# Patient Record
Sex: FEMALE | Race: WHITE | URBAN - METROPOLITAN AREA
[De-identification: names, ages, dates, MRNs, and addresses within clinical notes are randomized per-mention and may not be internally consistent; named-entity substitution may affect disease eponyms.]

---

## 2019-12-01 ENCOUNTER — HOSPITAL ENCOUNTER (OUTPATIENT)
Dept: MEDSURG UNIT | Facility: HOSPITAL | Age: 29
End: 2019-12-02
Attending: SURGERY | Admitting: SURGERY

## 2019-12-01 LAB
ABS NEUT (OLG): 11.14 X10(3)/MCL (ref 2.1–9.2)
ALBUMIN SERPL-MCNC: 4 GM/DL (ref 3.4–5)
ALBUMIN/GLOB SERPL: 1.2 RATIO (ref 1.1–2)
ALP SERPL-CCNC: 32 UNIT/L (ref 38–126)
ALT SERPL-CCNC: 21 UNIT/L (ref 12–78)
APPEARANCE, UA: CLEAR
AST SERPL-CCNC: 13 UNIT/L (ref 15–37)
BACTERIA SPEC CULT: ABNORMAL /HPF
BASOPHILS # BLD AUTO: 0 X10(3)/MCL (ref 0–0.2)
BASOPHILS NFR BLD AUTO: 0 %
BILIRUB SERPL-MCNC: 1.1 MG/DL (ref 0.2–1)
BILIRUB UR QL STRIP: NEGATIVE
BILIRUBIN DIRECT+TOT PNL SERPL-MCNC: 0.2 MG/DL (ref 0–0.5)
BILIRUBIN DIRECT+TOT PNL SERPL-MCNC: 0.9 MG/DL (ref 0–0.8)
BUN SERPL-MCNC: 14 MG/DL (ref 7–18)
CALCIUM SERPL-MCNC: 9 MG/DL (ref 8.5–10.1)
CHLORIDE SERPL-SCNC: 104 MMOL/L (ref 98–107)
CO2 SERPL-SCNC: 27 MMOL/L (ref 21–32)
COLOR UR: YELLOW
CREAT SERPL-MCNC: 0.76 MG/DL (ref 0.55–1.02)
EOSINOPHIL # BLD AUTO: 0 X10(3)/MCL (ref 0–0.9)
EOSINOPHIL NFR BLD AUTO: 0 %
ERYTHROCYTE [DISTWIDTH] IN BLOOD BY AUTOMATED COUNT: 11.6 % (ref 11.5–17)
GLOBULIN SER-MCNC: 3.4 GM/DL (ref 2.4–3.5)
GLUCOSE (UA): NEGATIVE
GLUCOSE SERPL-MCNC: 102 MG/DL (ref 74–106)
HCT VFR BLD AUTO: 37.5 % (ref 37–47)
HGB BLD-MCNC: 12 GM/DL (ref 12–16)
HGB UR QL STRIP: NEGATIVE
KETONES UR QL STRIP: NEGATIVE
LEUKOCYTE ESTERASE UR QL STRIP: ABNORMAL
LYMPHOCYTES # BLD AUTO: 1.4 X10(3)/MCL (ref 0.6–4.6)
LYMPHOCYTES NFR BLD AUTO: 10 %
MCH RBC QN AUTO: 29.7 PG (ref 27–31)
MCHC RBC AUTO-ENTMCNC: 32 GM/DL (ref 33–36)
MCV RBC AUTO: 92.8 FL (ref 80–94)
MONOCYTES # BLD AUTO: 0.7 X10(3)/MCL (ref 0.1–1.3)
MONOCYTES NFR BLD AUTO: 6 %
NEUTROPHILS # BLD AUTO: 11.14 X10(3)/MCL (ref 2.1–9.2)
NEUTROPHILS NFR BLD AUTO: 83 %
NITRITE UR QL STRIP: NEGATIVE
PH UR STRIP: 8.5 [PH] (ref 5–9)
PLATELET # BLD AUTO: 163 X10(3)/MCL (ref 130–400)
PMV BLD AUTO: 10.3 FL (ref 9.4–12.4)
POTASSIUM SERPL-SCNC: 3.8 MMOL/L (ref 3.5–5.1)
PROT SERPL-MCNC: 7.4 GM/DL (ref 6.4–8.2)
PROT UR QL STRIP: NEGATIVE
RBC # BLD AUTO: 4.04 X10(6)/MCL (ref 4.2–5.4)
RBC #/AREA URNS HPF: ABNORMAL /[HPF]
SODIUM SERPL-SCNC: 139 MMOL/L (ref 136–145)
SP GR UR STRIP: 1.01 (ref 1–1.03)
SQUAMOUS EPITHELIAL, UA: ABNORMAL
UROBILINOGEN UR STRIP-ACNC: 0.2
WBC # SPEC AUTO: 13.4 X10(3)/MCL (ref 4.5–11.5)
WBC #/AREA URNS HPF: ABNORMAL /[HPF]

## 2019-12-18 ENCOUNTER — HISTORICAL (OUTPATIENT)
Dept: ADMINISTRATIVE | Facility: HOSPITAL | Age: 29
End: 2019-12-18

## 2022-04-30 NOTE — ED PROVIDER NOTES
Patient:   Katrin Frye             MRN: [id number]            FIN: [id number]               Age:   29 years     Sex:  Female     :  1990   Associated Diagnoses:   Acute appendicitis   Author:   Usman LEWIS MD, Myron MORA      Basic Information   Time seen: Date & time 2019 13:12:00.   History source: Patient.   Arrival mode: Private vehicle, walking.   History limitation: None.      History of Present Illness   The patient presents with abdominal pain, 29-year-old white female complains right lower quadrant abdominal pain for 3 days intermittently worsening today since 3 AM with nausea vomiting.  Seen in urgent care referred here.  Denies fever.  Last cycle .  Sarah Hernandez nurse practitioner, SORT NOTE and   I, Dr. Mary, assumed care of this patient at 1417.     28 y/o CF presents to the ED for RLQ pain that started three days ago. Pt visited an urgent care earlier today and was told to come to the ED for possible appendicitis. Pt states that when the abdominal pain first started it was central and moved to her right side. Pt states that around 0300 she started becoming nauseous and vomiting. She also complains of constipation that resolved at 1200, chills, sweats, rhinorrhea, and bilateral low back pain however she denies any fever. Pt's LMP started on 10/31/19. .  The onset was 3  days ago.  The course/duration of symptoms is constant and worsening.  The character of symptoms is pressure.  The degree at onset was moderate.  The Location of pain at onset was right, lower, abdominal and mid epigastric.  The degree at present is minimal.  The Location of pain at present is right, lower and abdominal.  Radiating pain: both sides of the back.  lower back. The exacerbating factor is none.  The relieving factor is none.  Therapy today: Urgent care visit, Zofran.  Risk factors consist of none.  Associated symptoms: nausea, vomiting, back pain and chills.  Additional history: none.         Review of Systems   Constitutional symptoms:  Chills, sweats, no fever, no weakness.    Skin symptoms:  No rash   Eye symptoms:  No recent vision problems,    ENMT symptoms:  rhinorrhea, No ear pain,    Respiratory symptoms:  No shortness of breath, no orthopnea, no cough   Cardiovascular symptoms:  No chest pain, no palpitations   Gastrointestinal symptoms:  Abdominal pain, moderate, right lower quadrant, nausea, vomiting, constipation (Finally had BM at 1200), No diarrhea,    Genitourinary symptoms:  No dysuria, no hematuria.    Musculoskeletal symptoms:  Back pain, No Muscle pain,    Psychiatric symptoms:  No anxiety, no depression.    Allergy/immunologic symptoms:  No seasonal allergies, no food allergies.              Additional review of systems information: All other systems reviewed and otherwise negative.      Health Status   Allergies: No known allergies.   Medications:  (Selected)   Inpatient Medications  Ordered  Normal Saline (0.9% NS) IV 1,000 mL: 1,000 mL, 1,000 mL, IV, 999, start date 12/01/19 13:14:00 CST  Toradol: 30 mg, form: Injection, IV Push, Once, first dose 12/01/19 14:23:00 CST, stop date 12/01/19 14:23:00 CST, STAT.   Immunizations: Up to date.   Menstrual history: Last menstrual period: Date 10/31/2019.      Past Medical/ Family/ Social History   Medical history: Negative.   Surgical history: Negative.   Family history: Not significant.   Social history: Alcohol use: Regularly, Tobacco use: Denies.      Physical Examination               Vital Signs   Vital Signs   12/1/2019 13:10 CST      Temperature Oral          36.8 DegC                             Temperature Oral (calculated)             98.24 DegF                             Peripheral Pulse Rate     64 bpm                             Respiratory Rate          16 br/min                             SpO2                      98 %                             Oxygen Therapy            Room air                             Systolic  Blood Pressure   106 mmHg                             Diastolic Blood Pressure  62 mmHg  .   Basic Oxygen Information   12/1/2019 13:10 CST      SpO2                      98 %                             Oxygen Therapy            Room air  .   General:  Alert, no acute distress   Neurological:  Alert and oriented to person, place, time, and situation, No focal neurological deficit observed, CN II-XII intact.    Skin:  Warm, dry   Head:  Normocephalic, atraumatic   Neck:  Supple, trachea midline, no tenderness   Eye:  Pupils are equal, round and reactive to light, extraocular movements are intact   Ears, nose, mouth and throat:  Oral mucosa moist, no pharyngeal erythema or exudate.    Cardiovascular:  Regular rate and rhythm, No murmur, Normal peripheral perfusion, No edema   Respiratory:  Lungs are clear to auscultation, respirations are non-labored, breath sounds are equal, Symmetrical chest wall expansion.    Chest wall:  No tenderness.   Back:  Nontender, Normal range of motion, Normal alignment.    Musculoskeletal:  Normal ROM, normal strength, no tenderness.    Gastrointestinal:  Soft, Non distended, Normal bowel sounds, Tenderness: Moderate, right lower quadrant, voluntary, positive   Lymphatics:  No lymphadenopathy.   Psychiatric:  Cooperative, appropriate mood & affect, normal judgment.       Medical Decision Making   Documents reviewed:  Emergency department nurses' notes.   Orders  Launch Order Profile (Selected)   Inpatient Orders  Ordered  Normal Saline (0.9% NS) IV 1,000 mL: 1,000 mL, 1,000 mL, IV, 999, start date 12/01/19 13:14:00 CST  Toradol: 30 mg, form: Injection, IV Push, Once, first dose 12/01/19 14:23:00 CST, stop date 12/01/19 14:23:00 CST, STAT  Ordered (Collected)  Automated Diff: STAT collect, 12/01/19 14:10:00 CST, Blood, Collected, Stop date 12/01/19 14:10:00 CST, Lab Collect, Print Label By Order Location, 12/01/19 13:13:00 CST  CBC - includes Diff: STAT collect, 12/01/19 14:10:26 CST,  BLOOD, Collected, Stop date 12/01/19 14:10:00 CST, Lab Collect  CMP: STAT collect, 12/01/19 14:10:26 CST, BLOOD, Collected, Stop date 12/01/19 14:10:00 CST, Lab Collect  Ordered (Exam Ordered)  CT Abdomen and Pelvis W Contrast: Stat, 12/01/19 14:23:00 CST, Abdominal Pain, rlq pain r/o appy, None, Stretcher, Rad Type, 12/01/19 14:23:00 CST  Completed  POC UPT ED: Urine, Routine collect, Collected, 12/01/19 13:13:00 CST by David ST NP, Sarah AVILA, Stop date 12/01/19 13:13:00 CST, Nurse collect, Print Label By Order Location  UA a reflex to culture: Stat collect, Urine, 12/01/19 13:13:00 CST, Stop date 12/01/19 13:15:00 CST, Nurse collect, Print Label By Order Location.   Results review:  Lab results : Lab View   12/1/2019 14:10 CST      Sodium Lvl                139 mmol/L                             Potassium Lvl             3.8 mmol/L                             Chloride                  104 mmol/L                             CO2                       27.0 mmol/L                             Calcium Lvl               9.0 mg/dL                             Glucose Lvl               102 mg/dL                             BUN                       14.0 mg/dL                             Creatinine                0.76 mg/dL                             eGFR-AA                   >60 mL/min/1.73 m2  NA                             eGFR-BALA                  >60 mL/min/1.73 m2  NA                             Bili Total                1.1 mg/dL  HI                             Bili Direct               0.20 mg/dL                             Bili Indirect             0.90 mg/dL  HI                             AST                       13 unit/L  LOW                             ALT                       21 unit/L                             Alk Phos                  32 unit/L  LOW                             Total Protein             7.4 gm/dL                             Albumin Lvl               4.00 gm/dL                              Globulin                  3.40 gm/dL                             A/G Ratio                 1.2 ratio                             WBC                       13.4 x10(3)/mcL  HI                             RBC                       4.04 x10(6)/mcL  LOW                             Hgb                       12.0 gm/dL                             Hct                       37.5 %                             Platelet                  163 x10(3)/mcL                             MCV                       92.8 fL                             MCH                       29.7 pg                             MCHC                      32.0 gm/dL  LOW                             RDW                       11.6 %                             MPV                       10.3 fL                             Abs Neut                  11.14 x10(3)/mcL  HI                             Neutro Auto               83 %  NA                             Lymph Auto                10 %  NA                             Mono Auto                 6 %  NA                             Eos Auto                  0 %  NA                             Abs Eos                   0.0 x10(3)/mcL                             Basophil Auto             0 %  NA                             Abs Neutro                11.14 x10(3)/mcL  HI                             Abs Lymph                 1.4 x10(3)/mcL                             Abs Mono                  0.7 x10(3)/mcL                             Abs Baso                  0.0 x10(3)/mcL    12/1/2019 13:48 CST      U beta hCG Ql POC         Negative    12/1/2019 13:39 CST      UA Appear                 CLEAR                             UA Color                  YELLOW                             UA Spec Grav              1.009                             UA Bili                   Negative                             UA pH                     8.5                             UA Urobilinogen           0.2                              UA Blood                  Negative                             UA Glucose                Negative                             UA Ketones                Negative                             UA Protein                Negative                             UA Nitrite                Negative                             UA Leuk Est               Trace                             UA WBC                    NONE SEEN                             UA RBC                    NONE SEEN                             UA Bacteria               NONE SEEN /HPF                             UA Squam Epithelial       NONE SEEN  .   Radiology results:  Rad Results (ST)  < 12 hrs   Accession: [id number]  Order: CT Abdomen and Pelvis W Contrast  Report Dt/Tm: 12/01/2019 14:54  Report:   EXAMINATION: CT of the abdomen and pelvis with contrast     EXAMINATION DATE: 12/1/2019     COMPARISON: None     TECHNIQUE: Multiple cross-sectional images of the abdomen and pelvis  were obtained after the intravenous administration of contrast.  Coronal and sagittal reformatted images were obtained.     CLINICAL HISTORY: Right lower quadrant abdominal pain     FINDINGS:     Dependent atelectatic changes lungs. Heart size within normal limits.     The liver, spleen, adrenals, kidneys, and pancreas are normal. The  gallbladder is present.     Small bowel is of normal caliber. The colon is of normal caliber. The  appendix is identified and is hyperemic as well as distended with  adjacent inflammatory changes. The appendix measures up to 1.0 cm.  There is no evidence for perforation or abscess formation.     The uterus is anteverted. Follicular changes are identified as well as  a dominant follicle within the right ovary. The bladder is  underdistended and normal. No free fluid in the abdomen pelvis. No  lymphadenopathy. The course and caliber the abdominal aorta is normal.     No suspicious osseous lesions. The soft tissues are normal.     IMPRESSION:       Acute appendicitis without evidence for abscess formation.     Results relayed to Dr. Mary by Dr. Florentino on December 1, 2019 at  1452 hours. 2 patient identifiers utilized and read back occurred.      .      Reexamination/ Reevaluation   Time: 12/1/2019 16:32:00 .   Interventions: CT positive for appendicitis which clinical exam is consistent with discussed case with surgery will give IV Zosyn and admit.      Impression and Plan   Diagnosis   Acute appendicitis ([id number] K35.80)   Plan   Condition: Improved, Stable.    Disposition: Medically cleared, Admit time  12/1/2019 16:33:00, Admit to Inpatient Unit.    Follow up with: Primary Care Physician.    Counseled: Patient, Regarding diagnosis, Regarding diagnostic results, Regarding treatment plan, Regarding prescription, Patient indicated understanding of instructions.    Notes: I, Maryellen Raymond, acted solely as a scribe for and in the presence of Dr. Mary who performed the service. ,       This scribes note accurately reflects the work done by me I have reviewed the note and personally performed a history and physical and agree with all the documentation and findings.

## 2022-04-30 NOTE — OP NOTE
Patient:   Katrni Frye             MRN: [id number]            FIN: [id number]               Age:   29 years     Sex:  Female     :  1990   Associated Diagnoses:   None   Author:   Michelet Dodge MD            DATE OF SURGERY:    19    SURGEON:  Michelet Dodge MD    PREOPERATIVE DIAGNOSIS:  Acute Appendicitis    POSTOPERATIVE DIAGNOSIS:  Same.    OPERATIONS:  Laparoscopic appendectomy    Anesthesia:  General endotracheal anesthesia.   Estimated blood loss:  Less than 50 cc.   Blood administered:  None.   Lap and instrument counts correct x 2 at the end of the case.  Specimen: Appendix    INDICATIONS/SIGNIFICANT HISTORY:  The patient is a 29 year old female who seen with complaints of abdominal pain.  Pt states the abdominal has slowly worsened now located primarily in the Right Lower Quadrant.  The patient was worked up and found to have a diagnosis of Appendicitis.  Risks and Benefits of surgery was discussed with the patient, who voiced understanding of risks and benefits and elected to proceed with surgery.    PROCEDURE IN DETAIL:  Once informed consents were obtained, the patient was taken to the operating room and placed supine on the operating table.  After general endotracheal anesthesia was induced, the abdomen was prepped and draped in a standard surgical fashion.  A tom-umbilical incision was made with the scapel and the Veress needle was used to enter the abdominal cavity.  This was confirmed using the drop test.  Pneumoperitoneum was then obtained with insufflation to 15mm of pressure.  Once obtained, the Veress needle was removed and a 5mm trocar port was placed through this incision.  An additional 5mm trocar port was placed supra-pubic and a 12mm trocar port was placed in the left lower quadrant under direct visualization.  Attention was then directed to the right lower quadrant of the abdomen.  The appendix appeared to have acute inflammatory changes.  The appendix was  carefully freed from surrounding adhesions using blunt and sharp dissection.  The appendix was then retracted superiorly and the appendix was dissected near its base to the cecum.  Once an adequate tom-appendiceal dissection was performed, the appendix was transected with an Endo-SERENITY stapler.  The appendiceal mesentery was then transected with an Endo-SERENITY vascular load.  Small amount of bleeding was noted along the mesentery staple line and was controlled with a clip applier.  The appendix was then removed when an endo-catch bag through the right lower quadrant trocar port.  Attention was re-directed to the staple lines with no active bleeding noted.  The abdomen was copiously irrigated and suctioned.  All ports were removed under direct visualization with no active bleeding noted.  The right lower quadrant port abdominal fascia was closed with a 0 vicryl stitch.  Incisions were closed with a 4-0 vicryl stitch.  The wounds were cleaned and dried and steri-strips were applied.  The patient tolerated the procedure well and was transported to recovery room in good condition.

## 2022-05-04 NOTE — HISTORICAL [facility]
This is a historical note converted from [place]. Formatting and pictures may have been removed.  Please reference [employer] for original formatting and attached multimedia. Admit and Discharge Dates  Admit Date: 12/01/2019  Discharge Date: 12/02/2019  Physicians  Attending Physician - Echo CHAPIN, Michelet TAMAYO  Admitting Physician - Echo CHAPIN, Michelet TAMAYO  Primary Care Physician - Alexandre Madera MD  Discharge Diagnosis  Abdominal pain?[id number]  Acute appendicitis?K35.80  Surgical Procedures  12/01/2019 - [id number] - Appendectomy Laparoscopic  Immunizations  No immunizations recorded for this visit.  Admission Information  29yoF with no significant PMH who presented with one day hx of generalized?crampy abdominal pain that localized to RLQ. +subjective fever/chills, - N/V. CT Abd/Pelvis showed hyperemic appendix with adjacent inflammatory changes consistent with acute appendicitis.  Hospital Course  Ms Frye presented with symptoms consistent with acute appendicitis and underwent laparoscopic appendectomy on 12/1/2019. She tolerated procedure well with no acute complications and uncomplicated post operative course. At time of discharge, she had adequate pain control on PO medication, was tolerating diet w/o N/V, was ambulating independently, and had close follow up with the surgical hospitalist clinic on 12/16/2019.  Significant Findings  (12/01/2019 14:46 CST CT Abdomen and Pelvis W Contrast)  ?  Dependent atelectatic changes lungs. Heart size within normal limits.  ?  The liver, spleen, adrenals, kidneys, and pancreas are normal. The  gallbladder is present.  ?  Small bowel is of normal caliber. The colon is of normal caliber. The  appendix is identified and is hyperemic as well as distended with  adjacent inflammatory changes. The appendix measures up to 1.0 cm.  There is no evidence for perforation or abscess formation.  ?  The uterus is anteverted. Follicular changes are identified as  well as  a dominant follicle within the right ovary. The bladder is  underdistended and normal. No free fluid in the abdomen pelvis. No  lymphadenopathy. The course and caliber the abdominal aorta is normal.  ?  No suspicious osseous lesions. The soft tissues are normal.  ?  IMPRESSION:?  ?  Acute appendicitis without evidence for abscess formation.?  ?  12/1/2019 CBC?significant for WBC of 13/4  Objective  Vitals & Measurements  T:?36.6? ?C (Oral)? TMIN:?36.6? ?C (Oral)? TMAX:?36.8? ?C (Oral)? HR:?69(Peripheral)? RR:?21? BP:?104/66? SpO2:?99%? WT:?60.7?kg? BMI:?29.27?  Physical Exam  Gen: NAD, resting comfortably in bed  Neuro: AAOx3  CV: RR, no rubs, murmurs, gallops  Pulm: Breathing comfortably in RA, BS bilaterally  Abd: Lap incision c/d/i, steristrips in place, no erythema, induration. Appropriate tenderness to palpation, no rebound or guarding  Ext: Warm, well perfused, 2+ radials bilaterally  Patient Discharge Condition  Stable  Discharge Disposition  Home   Discharge Medication Reconciliation  Prescribed  acetaminophen (Tylenol)?1,000 mg, Oral, q6hr, PRN pain, mild  gabapentin (gabapentin 300 mg oral capsule)?300 mg, Oral, [id number]  oxyCODONE (oxycodone 5 mg oral tablet)?5 mg, Oral, q4hr, PRN pain, moderate  Education and Orders Provided  Appendicitis, Easy-to-Read  Laparoscopic Appendectomy, Adult, Care After, Easy-to-Read  Discharge - 12/02/19 13:30:00 CST, Home, Give all scheduled vaccinations prior to discharge.?  Discharge Activity - Activity as Tolerated?  Discharge Diet - Regular?  Follow up  Surgical Hospitalist Clinic Appointment, on 12/16/2019  ????  Keep scheduled appointment

## 2022-05-04 NOTE — HISTORICAL [facility]ER
This is a historical note converted from [place]. Formatting and pictures may have been removed.  Please reference [place] for original formatting and attached multimedia. Chief Complaint  c/o intermittent RLQ abd pain with nausea and vomitting x 3 days; Sent here from Urgent care for possible appendicitis. given Zofran PO and IV PTA.  History of Present Illness  29yF with no significant pmh with one day history of generalized crampy abdominal pain that localized to RLQ. She complained of moderate, severe crampy pain that started last evening and localized to RLQ this morning. Subjective fevers, chills, nausea. No vomiting. No dysuria, hematuria, changes in bowel function. No other complaints.  Review of Systems  10 point ROS negative except HPI  Physical Exam  Vitals & Measurements  T:?36.8? ?C (Oral)? HR:?57(Peripheral)? RR:?16? BP:?108/67? SpO2:?100%?  NAD AAO  NCAT  Reg rate  CTA  Abd soft, ND, TTP in RLQ with localized peritonitis  No CCE  ?  Labs Last 24 Hours?  ?Chemistry? Hematology/Coagulation?   Sodium Lvl: 139 mmol/L (12/01/19 14:10:00) WBC:?13.4 x10(3)/mcL?High (12/01/19 14:10:00)   Potassium Lvl: 3.8 mmol/L (12/01/19 14:10:00) RBC:?4.04 x10(6)/mcL?Low (12/01/19 14:10:00)   Chloride: 104 mmol/L (12/01/19 14:10:00) Hgb: 12 gm/dL (12/01/19 14:10:00)   CO2: 27 mmol/L (12/01/19 14:10:00) Hct: 37.5 % (12/01/19 14:10:00)   Calcium Lvl: 9 mg/dL (12/01/19 14:10:00) Platelet: 163 x10(3)/mcL (12/01/19 14:10:00)   Glucose Lvl: 102 mg/dL (12/01/19 14:10:00) MCV: 92.8 fL (12/01/19 14:10:00)   BUN: 14 mg/dL (12/01/19 14:10:00) MCH: 29.7 pg (12/01/19 14:10:00)   Creatinine: 0.76 mg/dL (12/01/19 14:10:00) MCHC:?32 gm/dL?Low (12/01/19 14:10:00)   eGFR-AA: >60 (12/01/19 14:10:00) RDW: 11.6 % (12/01/19 14:10:00)   eGFR-BALA: >60 (12/01/19 14:10:00) MPV: 10.3 fL (12/01/19 14:10:00)   Bili Total:?1.1 mg/dL?High (12/01/19 14:10:00) Abs Neut:?11.14 x10(3)/mcL?High (12/01/19 14:10:00)   Bili Direct: 0.2 mg/dL (12/01/19 14:10:00)  Neutro Auto: 83 % (12/01/19 14:10:00)   Bili Indirect:?0.9 mg/dL?High (12/01/19 14:10:00) Lymph Auto: 10 % (12/01/19 14:10:00)   AST:?13 unit/L?Low (12/01/19 14:10:00) Mono Auto: 6 % (12/01/19 14:10:00)   ALT: 21 unit/L (12/01/19 14:10:00) Eos Auto: 0 % (12/01/19 14:10:00)   Alk Phos:?32 unit/L?Low (12/01/19 14:10:00) Abs Eos: 0 x10(3)/mcL (12/01/19 14:10:00)   Total Protein: 7.4 gm/dL (12/01/19 14:10:00) Basophil Auto: 0 % (12/01/19 14:10:00)   Albumin Lvl: 4 gm/dL (12/01/19 14:10:00) Abs Neutro:?11.14 x10(3)/mcL?High (12/01/19 14:10:00)   Globulin: 3.4 gm/dL (12/01/19 14:10:00) Abs Lymph: 1.4 x10(3)/mcL (12/01/19 14:10:00)   A/G Ratio: 1.2 ratio (12/01/19 14:10:00) Abs Mono: 0.7 x10(3)/mcL (12/01/19 14:10:00)    Abs Baso: 0 x10(3)/mcL (12/01/19 14:10:00)   ?  ?  Accession:?[id number]  Order:?CT Abdomen and Pelvis W Contrast  Report Dt/Tm:?12/01/2019 14:54  Report:?  EXAMINATION: CT of the abdomen and pelvis with contrast  ?  EXAMINATION DATE: 12/1/2019  ?  COMPARISON: None  ?  TECHNIQUE: Multiple cross-sectional images of the abdomen and pelvis  were obtained after the intravenous administration of contrast.  Coronal and sagittal reformatted images were obtained.  ?  CLINICAL HISTORY: Right lower quadrant abdominal pain  ?  FINDINGS:  ?  Dependent atelectatic changes lungs. Heart size within normal limits.  ?  The liver, spleen, adrenals, kidneys, and pancreas are normal. The  gallbladder is present.  ?  Small bowel is of normal caliber. The colon is of normal caliber. The  appendix is identified and is hyperemic as well as distended with  adjacent inflammatory changes. The appendix measures up to 1.0 cm.  There is no evidence for perforation or abscess formation.  ?  The uterus is anteverted. Follicular changes are identified as well as  a dominant follicle within the right ovary. The bladder is  underdistended and normal. No free fluid in the abdomen pelvis. No  lymphadenopathy. The course and caliber the  abdominal aorta is normal.  ?  No suspicious osseous lesions. The soft tissues are normal.  ?  IMPRESSION:?  ?  Acute appendicitis without evidence for abscess formation.  ?  Results relayed to Dr. Mary by Dr. Florentino on December 1, 2019 at  1452 hours. 2 patient identifiers utilized and read back occurred.  ?  ?  Assessment/Plan  Abdominal pain?[id number]  ?  29yF with Acute appendicitis  - Admit to surgery  - NPO, IVF  - Zosyn  - OR today for laparoscopic appendectomy  - Risks benefits and alternatives discussed, consents obtained  ?  Rolanda Hernandez MD   Problem List/Past Medical History  Ongoing  No qualifying data  Historical  No qualifying data  Medications  Inpatient  Normal Saline (0.9% NS) IV 1,000 mL, 1000 mL, IV  Zosyn 3.375 gm (for IVPB)  Home  No active home medications  Allergies  No Known Medication Allergies  Social History  Abuse/Neglect  No, 12/01/2019  Tobacco  Never (less than 100 in lifetime), N/A, 12/01/2019      I was present with the resident during? roundings.? Agree with plan as noted above.  ???  [x ] I discussed the case with the resident and agree with the findings and plan as documented in the residents note.  [ ] I discussed the case with the resident and agree with the findings and plan as documented in the residents note except:I was present with the resident during? roundings.? Agree with plan as noted above.  ???  [x ] I discussed the case with the resident and agree with the findings and plan as documented in the residents note.  [ ] I discussed the case with the resident and agree with the findings and plan as documented in the residents note except: